# Patient Record
Sex: FEMALE | Race: WHITE | NOT HISPANIC OR LATINO | URBAN - METROPOLITAN AREA
[De-identification: names, ages, dates, MRNs, and addresses within clinical notes are randomized per-mention and may not be internally consistent; named-entity substitution may affect disease eponyms.]

---

## 2020-06-09 ENCOUNTER — APPOINTMENT (RX ONLY)
Dept: URBAN - METROPOLITAN AREA CLINIC 176 | Facility: CLINIC | Age: 30
Setting detail: DERMATOLOGY
End: 2020-06-09

## 2020-06-09 DIAGNOSIS — Z41.9 ENCOUNTER FOR PROCEDURE FOR PURPOSES OTHER THAN REMEDYING HEALTH STATE, UNSPECIFIED: ICD-10-CM

## 2020-06-09 PROCEDURE — ? BOTOX

## 2020-06-09 NOTE — PROCEDURE: BOTOX
Additional Area 1 Units: 0
Show Topical Anesthesia: Yes
Show Lcl Units: No
Detail Level: Detailed
Expiration Date (Month Year): 11/2022
Price (Use Numbers Only, No Special Characters Or $): 576
Forehead Units: 10
Lot #: [id number]
Dilution (U/0.1 Cc): 5
Consent: Written consent obtained. Risks include but not limited to lid/brow ptosis, bruising, swelling, diplopia, temporary effect, incomplete chemical denervation.
Additional Area 1 Location: Chin
Glabellar Complex Units: 12.5
Post-Care Instructions: Patient instructed to not lie down for 4 hours and limit physical activity for 24 hours. Patient instructed not to travel by airplane for 48 hours.

## 2021-01-11 ENCOUNTER — APPOINTMENT (RX ONLY)
Dept: URBAN - METROPOLITAN AREA CLINIC 176 | Facility: CLINIC | Age: 31
Setting detail: DERMATOLOGY
End: 2021-01-11

## 2021-01-11 DIAGNOSIS — Z41.9 ENCOUNTER FOR PROCEDURE FOR PURPOSES OTHER THAN REMEDYING HEALTH STATE, UNSPECIFIED: ICD-10-CM

## 2021-01-11 PROCEDURE — ? BOTOX

## 2021-01-11 NOTE — PROCEDURE: BOTOX
Additional Area 1 Units: 0
Show Topical Anesthesia: Yes
Show Lcl Units: No
Detail Level: Detailed
Expiration Date (Month Year): 08/2023
Price (Use Numbers Only, No Special Characters Or $): 415
Forehead Units: 10
Lot #: [id number]
Dilution (U/0.1 Cc): 5
Consent: Written consent obtained. Risks include but not limited to lid/brow ptosis, bruising, swelling, diplopia, temporary effect, incomplete chemical denervation.
Additional Area 1 Location: Chin
Glabellar Complex Units: 12.5
Post-Care Instructions: Patient instructed to not lie down for 4 hours and limit physical activity for 24 hours. Patient instructed not to travel by airplane for 48 hours.

## 2021-01-27 ENCOUNTER — APPOINTMENT (RX ONLY)
Dept: URBAN - METROPOLITAN AREA CLINIC 176 | Facility: CLINIC | Age: 31
Setting detail: DERMATOLOGY
End: 2021-01-27

## 2021-01-27 DIAGNOSIS — Z41.9 ENCOUNTER FOR PROCEDURE FOR PURPOSES OTHER THAN REMEDYING HEALTH STATE, UNSPECIFIED: ICD-10-CM

## 2021-01-27 PROCEDURE — ? BOTOX

## 2021-01-27 NOTE — PROCEDURE: BOTOX
Additional Area 1 Units: 0
Show Topical Anesthesia: Yes
Show Lcl Units: No
Detail Level: Detailed
Expiration Date (Month Year): 08/2023
Forehead Units: 1
Lot #: [id number]
Dilution (U/0.1 Cc): 5
Consent: Written consent obtained. Risks include but not limited to lid/brow ptosis, bruising, swelling, diplopia, temporary effect, incomplete chemical denervation.
Additional Area 1 Location: Chin
Post-Care Instructions: Patient instructed to not lie down for 4 hours and limit physical activity for 24 hours. Patient instructed not to travel by airplane for 48 hours.

## 2021-03-16 ENCOUNTER — APPOINTMENT (RX ONLY)
Dept: URBAN - METROPOLITAN AREA CLINIC 176 | Facility: CLINIC | Age: 31
Setting detail: DERMATOLOGY
End: 2021-03-16

## 2021-03-16 DIAGNOSIS — Z41.9 ENCOUNTER FOR PROCEDURE FOR PURPOSES OTHER THAN REMEDYING HEALTH STATE, UNSPECIFIED: ICD-10-CM

## 2021-03-16 PROCEDURE — ? DYSPORT

## 2021-03-16 NOTE — PROCEDURE: DYSPORT
Levator Labii Superioris Units: 0
Additional Area 1 Location: Submandibular gland
Show Depressor Anguli Units: Yes
Expiration Date (Month Year): 09/30/2021
Glabellar Complex Units: 30
Show Mentalis Units: No
Detail Level: Detailed
Consent: Written consent obtained. Risks include but not limited to lid/brow ptosis, bruising, swelling, diplopia, temporary effect, incomplete chemical denervation.
Dilution (U/ 0.1cc): 10
Price (Use Numbers Only, No Special Characters Or $): 200
Additional Area 3 Location: chin
Post-Care Instructions: Patient instructed to not lie down for 4 hours and limit physical activity for 24 hours. Patient instructed not to travel by airplane for 48 hours.
Lot #: [id number]
Forehead Units: 30
Additional Area 2 Location: Orbicularis oris

## 2021-05-14 ENCOUNTER — APPOINTMENT (RX ONLY)
Dept: URBAN - METROPOLITAN AREA CLINIC 176 | Facility: CLINIC | Age: 31
Setting detail: DERMATOLOGY
End: 2021-05-14

## 2021-05-14 VITALS — WEIGHT: 186 LBS | HEIGHT: 65 IN

## 2021-05-14 DIAGNOSIS — L20.89 OTHER ATOPIC DERMATITIS: ICD-10-CM

## 2021-05-14 DIAGNOSIS — I78.8 OTHER DISEASES OF CAPILLARIES: ICD-10-CM

## 2021-05-14 DIAGNOSIS — L65.0 TELOGEN EFFLUVIUM: ICD-10-CM

## 2021-05-14 PROBLEM — L20.84 INTRINSIC (ALLERGIC) ECZEMA: Status: ACTIVE | Noted: 2021-05-14

## 2021-05-14 PROCEDURE — ? COUNSELING

## 2021-05-14 PROCEDURE — ? PRESCRIPTION

## 2021-05-14 PROCEDURE — ? PRESCRIPTION MEDICATION MANAGEMENT

## 2021-05-14 PROCEDURE — 99213 OFFICE O/P EST LOW 20 MIN: CPT

## 2021-05-14 RX ORDER — TRIAMCINOLONE ACETONIDE 1 MG/G
CREAM TOPICAL BID
Qty: 1 | Refills: 1 | Status: ERX | COMMUNITY
Start: 2021-05-14

## 2021-05-14 RX ADMIN — TRIAMCINOLONE ACETONIDE: 1 CREAM TOPICAL at 00:00

## 2021-05-14 ASSESSMENT — LOCATION DETAILED DESCRIPTION DERM
LOCATION DETAILED: SUPERIOR MID FOREHEAD
LOCATION DETAILED: RIGHT OCCIPITAL SCALP
LOCATION DETAILED: RIGHT SUPERIOR PARIETAL SCALP
LOCATION DETAILED: LEFT DISTAL PRETIBIAL REGION
LOCATION DETAILED: RIGHT DISTAL PRETIBIAL REGION
LOCATION DETAILED: RIGHT MEDIAL BUTTOCK
LOCATION DETAILED: LEFT OCCIPITAL SCALP

## 2021-05-14 ASSESSMENT — LOCATION SIMPLE DESCRIPTION DERM
LOCATION SIMPLE: SCALP
LOCATION SIMPLE: RIGHT BUTTOCK
LOCATION SIMPLE: SUPERIOR FOREHEAD
LOCATION SIMPLE: RIGHT PRETIBIAL REGION
LOCATION SIMPLE: POSTERIOR SCALP
LOCATION SIMPLE: LEFT PRETIBIAL REGION

## 2021-05-14 ASSESSMENT — LOCATION ZONE DERM
LOCATION ZONE: TRUNK
LOCATION ZONE: SCALP
LOCATION ZONE: LEG
LOCATION ZONE: FACE

## 2021-05-14 ASSESSMENT — SEVERITY ASSESSMENT 2020: SEVERITY 2020: MILD

## 2021-05-14 NOTE — HPI: SKIN LESION
Has Your Skin Lesion Been Treated?: not been treated
Is This A New Presentation, Or A Follow-Up?: Skin Lesion
Additional History: Patient states she has had the lesion since after she had her son.

## 2021-05-14 NOTE — PROCEDURE: PRESCRIPTION MEDICATION MANAGEMENT
Initiate Treatment: TAC cream BID as needed.
Render In Strict Bullet Format?: No
Detail Level: Detailed
Samples Given: Dove for men cleanser, Cetaphil cream qd

## 2021-06-09 ENCOUNTER — APPOINTMENT (RX ONLY)
Dept: URBAN - METROPOLITAN AREA CLINIC 176 | Facility: CLINIC | Age: 31
Setting detail: DERMATOLOGY
End: 2021-06-09

## 2021-06-09 DIAGNOSIS — Z41.9 ENCOUNTER FOR PROCEDURE FOR PURPOSES OTHER THAN REMEDYING HEALTH STATE, UNSPECIFIED: ICD-10-CM

## 2021-06-09 PROCEDURE — ? DYSPORT

## 2021-06-09 NOTE — PROCEDURE: DYSPORT
Levator Labii Superioris Units: 0
Additional Area 1 Location: Submandibular gland
Show Depressor Anguli Units: Yes
Expiration Date (Month Year): 01/31/2022
Glabellar Complex Units: 30
Show Mentalis Units: No
Detail Level: Detailed
Consent: Written consent obtained. Risks include but not limited to lid/brow ptosis, bruising, swelling, diplopia, temporary effect, incomplete chemical denervation.
Dilution (U/ 0.1cc): 10
Price (Use Numbers Only, No Special Characters Or $): 200
Additional Area 3 Location: chin
Post-Care Instructions: Patient instructed to not lie down for 4 hours and limit physical activity for 24 hours. Patient instructed not to travel by airplane for 48 hours.
Lot #: [id number]
Forehead Units: 30
Additional Area 2 Location: Orbicularis oris

## 2021-11-02 ENCOUNTER — APPOINTMENT (RX ONLY)
Dept: URBAN - METROPOLITAN AREA CLINIC 176 | Facility: CLINIC | Age: 31
Setting detail: DERMATOLOGY
End: 2021-11-02

## 2021-11-02 DIAGNOSIS — Z41.9 ENCOUNTER FOR PROCEDURE FOR PURPOSES OTHER THAN REMEDYING HEALTH STATE, UNSPECIFIED: ICD-10-CM

## 2021-11-02 PROCEDURE — ? DYSPORT

## 2021-11-02 NOTE — PROCEDURE: DYSPORT
Levator Labii Superioris Units: 0
Additional Area 1 Location: Submandibular gland
Show Depressor Anguli Units: Yes
Expiration Date (Month Year): 3/31/2022
Glabellar Complex Units: 30
Show Mentalis Units: No
Detail Level: Detailed
Consent: Written consent obtained. Risks include but not limited to lid/brow ptosis, bruising, swelling, diplopia, temporary effect, incomplete chemical denervation.
Dilution (U/ 0.1cc): 10
Price (Use Numbers Only, No Special Characters Or $): 240
Additional Area 3 Location: chin
Post-Care Instructions: Patient instructed to not lie down for 4 hours and limit physical activity for 24 hours. Patient instructed not to travel by airplane for 48 hours.
Lot #: [id number]
Forehead Units: 30
Additional Area 2 Location: Orbicularis oris

## 2022-03-10 ENCOUNTER — APPOINTMENT (RX ONLY)
Dept: URBAN - METROPOLITAN AREA CLINIC 176 | Facility: CLINIC | Age: 32
Setting detail: DERMATOLOGY
End: 2022-03-10

## 2022-03-10 DIAGNOSIS — Z41.9 ENCOUNTER FOR PROCEDURE FOR PURPOSES OTHER THAN REMEDYING HEALTH STATE, UNSPECIFIED: ICD-10-CM

## 2022-03-10 PROCEDURE — ? DYSPORT

## 2022-03-10 NOTE — PROCEDURE: DYSPORT
Levator Labii Superioris Units: 0
Additional Area 1 Location: Submandibular gland
Show Depressor Anguli Units: Yes
Expiration Date (Month Year): 8/31/2022
Glabellar Complex Units: 30
Show Mentalis Units: No
Detail Level: Detailed
Consent: Written consent obtained. Risks include but not limited to lid/brow ptosis, bruising, swelling, diplopia, temporary effect, incomplete chemical denervation.
Dilution (U/ 0.1cc): 10
Price (Use Numbers Only, No Special Characters Or $): 240
Additional Area 3 Location: chin
Post-Care Instructions: Patient instructed to not lie down for 4 hours and limit physical activity for 24 hours. Patient instructed not to travel by airplane for 48 hours.
Lot #: [id number]
Forehead Units: 30
Additional Area 2 Location: Orbicularis oris

## 2022-11-07 ENCOUNTER — APPOINTMENT (RX ONLY)
Dept: URBAN - METROPOLITAN AREA CLINIC 176 | Facility: CLINIC | Age: 32
Setting detail: DERMATOLOGY
End: 2022-11-07

## 2022-11-07 DIAGNOSIS — Z41.9 ENCOUNTER FOR PROCEDURE FOR PURPOSES OTHER THAN REMEDYING HEALTH STATE, UNSPECIFIED: ICD-10-CM

## 2022-11-07 PROCEDURE — ? DYSPORT

## 2022-11-07 NOTE — PROCEDURE: DYSPORT
Levator Labii Superioris Units: 0
Additional Area 1 Location: Submandibular gland
Show Depressor Anguli Units: Yes
Expiration Date (Month Year): 3/31/2023
Glabellar Complex Units: 25
Show Mentalis Units: No
Detail Level: Detailed
Consent: Written consent obtained. Risks include but not limited to lid/brow ptosis, bruising, swelling, diplopia, temporary effect, incomplete chemical denervation.
Dilution (U/ 0.1cc): 10
Periorbital Skin Units: 5
Price (Use Numbers Only, No Special Characters Or $): 240
Additional Area 3 Location: chin
Post-Care Instructions: Patient instructed to not lie down for 4 hours and limit physical activity for 24 hours. Patient instructed not to travel by airplane for 48 hours.
Lot #: [id number]
Forehead Units: 30
Additional Area 2 Location: Orbicularis oris

## 2022-12-12 ENCOUNTER — APPOINTMENT (RX ONLY)
Dept: URBAN - METROPOLITAN AREA CLINIC 176 | Facility: CLINIC | Age: 32
Setting detail: DERMATOLOGY
End: 2022-12-12

## 2022-12-12 DIAGNOSIS — Z41.9 ENCOUNTER FOR PROCEDURE FOR PURPOSES OTHER THAN REMEDYING HEALTH STATE, UNSPECIFIED: ICD-10-CM

## 2022-12-12 PROCEDURE — ? DYSPORT

## 2022-12-12 NOTE — PROCEDURE: DYSPORT
Levator Labii Superioris Units: 0
Show Levator Superior Units: Yes
Consent: Written consent obtained. Risks include but not limited to lid/brow ptosis, bruising, swelling, diplopia, temporary effect, incomplete chemical denervation.
Additional Area 3 Location: chin
Show Ucl Units: No
Post-Care Instructions: Patient instructed to not lie down for 4 hours and limit physical activity for 24 hours. Patient instructed not to travel by airplane for 48 hours.
Lot #: [id number]
Dilution (U/ 0.1cc): 10
Forehead Units: 7.5
Expiration Date (Month Year): 05/31/2023
Additional Area 2 Location: Orbicularis oris
Detail Level: Detailed

## 2023-02-23 ENCOUNTER — APPOINTMENT (RX ONLY)
Dept: URBAN - METROPOLITAN AREA CLINIC 176 | Facility: CLINIC | Age: 33
Setting detail: DERMATOLOGY
End: 2023-02-23

## 2023-02-23 DIAGNOSIS — L30.9 DERMATITIS, UNSPECIFIED: ICD-10-CM

## 2023-02-23 DIAGNOSIS — Z41.9 ENCOUNTER FOR PROCEDURE FOR PURPOSES OTHER THAN REMEDYING HEALTH STATE, UNSPECIFIED: ICD-10-CM

## 2023-02-23 PROCEDURE — ? ADDITIONAL NOTES

## 2023-02-23 PROCEDURE — ? DYSPORT

## 2023-02-23 PROCEDURE — ? PRESCRIPTION

## 2023-02-23 RX ORDER — CLOBETASOL PROPIONATE 0.5 MG/G
OINTMENT TOPICAL BID
Qty: 15 | Refills: 1 | Status: ERX | COMMUNITY
Start: 2023-02-23

## 2023-02-23 RX ADMIN — CLOBETASOL PROPIONATE: 0.5 OINTMENT TOPICAL at 00:00

## 2023-02-23 ASSESSMENT — LOCATION ZONE DERM: LOCATION ZONE: TRUNK

## 2023-02-23 ASSESSMENT — LOCATION DETAILED DESCRIPTION DERM: LOCATION DETAILED: RIGHT BUTTOCK

## 2023-02-23 ASSESSMENT — LOCATION SIMPLE DESCRIPTION DERM: LOCATION SIMPLE: RIGHT BUTTOCK

## 2023-02-23 NOTE — PROCEDURE: ADDITIONAL NOTES
Additional Notes: Plan to try dysport once more at current dose of 60units. If not improved will increase units next time.\\nReassured patient dose=duration and should come in q 3 months.
Render Risk Assessment In Note?: yes
Detail Level: Detailed

## 2023-02-23 NOTE — PROCEDURE: DYSPORT
Levator Labii Superioris Units: 0
Additional Comments: Suggested going up to 120 units but wants to stick to 60 u
Show Levator Superior Units: Yes
Consent: Written consent obtained. Risks include but not limited to lid/brow ptosis, bruising, swelling, diplopia, temporary effect, incomplete chemical denervation.
Additional Area 3 Location: chin
Show Ucl Units: No
Post-Care Instructions: Patient instructed to not lie down for 4 hours and limit physical activity for 24 hours. Patient instructed not to travel by airplane for 48 hours.
Lot #: [id number]
Dilution (U/ 0.1cc): 10
Forehead Units: 30
Glabellar Complex Units: 22.5
Inferior Lateral Orbicularis Oculi Units: 7.5
Expiration Date (Month Year): 05/31/2023
Additional Area 2 Location: Orbicularis oris
Price (Use Numbers Only, No Special Characters Or $): 300
Detail Level: Detailed

## 2024-01-25 ENCOUNTER — APPOINTMENT (RX ONLY)
Dept: URBAN - METROPOLITAN AREA CLINIC 176 | Facility: CLINIC | Age: 34
Setting detail: DERMATOLOGY
End: 2024-01-25

## 2024-01-25 DIAGNOSIS — Z41.9 ENCOUNTER FOR PROCEDURE FOR PURPOSES OTHER THAN REMEDYING HEALTH STATE, UNSPECIFIED: ICD-10-CM

## 2024-01-25 PROCEDURE — ? DYSPORT

## 2024-01-25 PROCEDURE — ? ADDITIONAL NOTES

## 2024-01-25 NOTE — PROCEDURE: DYSPORT
Levator Labii Superioris Units: 0
Additional Comments: Suggested going up to 120 units but wants to stick to 60 u
Show Levator Superior Units: Yes
Consent: Written consent obtained. Risks include but not limited to lid/brow ptosis, bruising, swelling, diplopia, temporary effect, incomplete chemical denervation.
Additional Area 3 Location: chin
Show Ucl Units: No
Post-Care Instructions: Patient instructed to not lie down for 4 hours and limit physical activity for 24 hours. Patient instructed not to travel by airplane for 48 hours.
Lot #: [id number]
Dilution (U/0.1 Cc): 10
Forehead Units: 30
Glabellar Complex Units: 22.5
Inferior Lateral Orbicularis Oculi Units: 7.5
Expiration Date (Month Year): 06/30/2025
Additional Area 2 Location: Orbicularis oris
Price (Use Numbers Only, No Special Characters Or $): 300
Detail Level: Detailed

## 2024-04-03 ENCOUNTER — APPOINTMENT (RX ONLY)
Dept: URBAN - METROPOLITAN AREA CLINIC 176 | Facility: CLINIC | Age: 34
Setting detail: DERMATOLOGY
End: 2024-04-03

## 2024-04-03 DIAGNOSIS — Z41.9 ENCOUNTER FOR PROCEDURE FOR PURPOSES OTHER THAN REMEDYING HEALTH STATE, UNSPECIFIED: ICD-10-CM

## 2024-04-03 PROCEDURE — ? ADDITIONAL NOTES

## 2024-04-03 PROCEDURE — ? DYSPORT

## 2024-04-03 NOTE — PROCEDURE: DYSPORT
Levator Labii Superioris Units: 0
Additional Comments: Suggested going up to 120 units but wants to stick to 60 u
Show Levator Superior Units: Yes
Consent: Written consent obtained. Risks include but not limited to lid/brow ptosis, bruising, swelling, diplopia, temporary effect, incomplete chemical denervation.
Additional Area 3 Location: chin
Show Ucl Units: No
Periorbital Skin Units: 7.5
Post-Care Instructions: Patient instructed to not lie down for 4 hours and limit physical activity for 24 hours. Patient instructed not to travel by airplane for 48 hours.
Lot #: [id number]
Dilution (U/0.1 Cc): 10
Forehead Units: 37.5
Glabellar Complex Units: 15
Expiration Date (Month Year): 8/31/25
Additional Area 2 Location: Orbicularis oris
Price (Use Numbers Only, No Special Characters Or $): 300
Detail Level: Detailed

## 2024-06-18 ENCOUNTER — APPOINTMENT (RX ONLY)
Dept: URBAN - METROPOLITAN AREA CLINIC 176 | Facility: CLINIC | Age: 34
Setting detail: DERMATOLOGY
End: 2024-06-18

## 2024-06-18 DIAGNOSIS — Z41.9 ENCOUNTER FOR PROCEDURE FOR PURPOSES OTHER THAN REMEDYING HEALTH STATE, UNSPECIFIED: ICD-10-CM

## 2024-06-18 DIAGNOSIS — L91.8 OTHER HYPERTROPHIC DISORDERS OF THE SKIN: ICD-10-CM

## 2024-06-18 PROCEDURE — ? LIQUID NITROGEN (COSMETIC)

## 2024-06-18 PROCEDURE — ? DYSPORT

## 2024-06-18 ASSESSMENT — LOCATION ZONE DERM
LOCATION ZONE: TRUNK
LOCATION ZONE: AXILLAE

## 2024-06-18 ASSESSMENT — LOCATION DETAILED DESCRIPTION DERM
LOCATION DETAILED: RIGHT AXILLARY VAULT
LOCATION DETAILED: SUBXIPHOID
LOCATION DETAILED: RIGHT RIB CAGE

## 2024-06-18 ASSESSMENT — LOCATION SIMPLE DESCRIPTION DERM
LOCATION SIMPLE: RIGHT AXILLARY VAULT
LOCATION SIMPLE: ABDOMEN

## 2024-06-18 NOTE — PROCEDURE: DYSPORT
Levator Labii Superioris Units: 0
Additional Comments: Suggested going up to 120 units but wants to stick to 60 u
Show Levator Superior Units: Yes
Consent: Written consent obtained. Risks include but not limited to lid/brow ptosis, bruising, swelling, diplopia, temporary effect, incomplete chemical denervation.
Additional Area 3 Location: chin
Show Ucl Units: No
Periorbital Skin Units: 7.5
Post-Care Instructions: Patient instructed to not lie down for 4 hours and limit physical activity for 24 hours. Patient instructed not to travel by airplane for 48 hours.
Lot #: [id number]
Dilution (U/0.1 Cc): 10
Forehead Units: 37.5
Glabellar Complex Units: 15
Expiration Date (Month Year): 11/30/25
Additional Area 2 Location: Orbicularis oris
Price (Use Numbers Only, No Special Characters Or $): 300
Detail Level: Detailed

## 2024-10-29 ENCOUNTER — APPOINTMENT (RX ONLY)
Dept: URBAN - METROPOLITAN AREA CLINIC 176 | Facility: CLINIC | Age: 34
Setting detail: DERMATOLOGY
End: 2024-10-29

## 2024-10-29 DIAGNOSIS — Z41.9 ENCOUNTER FOR PROCEDURE FOR PURPOSES OTHER THAN REMEDYING HEALTH STATE, UNSPECIFIED: ICD-10-CM

## 2024-10-29 PROCEDURE — ? DYSPORT

## 2024-10-29 NOTE — PROCEDURE: DYSPORT
Levator Labii Superioris Units: 0
Additional Comments: Suggested going up to 120 units but wants to stick to 60 u
Show Levator Superior Units: Yes
Consent: Written consent obtained. Risks include but not limited to lid/brow ptosis, bruising, swelling, diplopia, temporary effect, incomplete chemical denervation.
Additional Area 3 Location: chin
Show Ucl Units: No
Periorbital Skin Units: 7.5
Post-Care Instructions: Patient instructed to not lie down for 4 hours and limit physical activity for 24 hours. Patient instructed not to travel by airplane for 48 hours.
Lot #: [id number]
Dilution (U/0.1 Cc): 10
Forehead Units: 37.5
Glabellar Complex Units: 15
Expiration Date (Month Year): 4/30/2026
Additional Area 2 Location: Orbicularis oris
Price (Use Numbers Only, No Special Characters Or $): 300
Detail Level: Detailed

## 2025-03-05 ENCOUNTER — APPOINTMENT (OUTPATIENT)
Dept: URBAN - METROPOLITAN AREA CLINIC 176 | Facility: CLINIC | Age: 35
Setting detail: DERMATOLOGY
End: 2025-03-05

## 2025-05-06 ENCOUNTER — APPOINTMENT (OUTPATIENT)
Dept: URBAN - METROPOLITAN AREA CLINIC 176 | Facility: CLINIC | Age: 35
Setting detail: DERMATOLOGY
End: 2025-05-06

## 2025-05-30 ENCOUNTER — APPOINTMENT (OUTPATIENT)
Dept: URBAN - METROPOLITAN AREA CLINIC 176 | Facility: CLINIC | Age: 35
Setting detail: DERMATOLOGY
End: 2025-05-30

## 2025-07-01 ENCOUNTER — APPOINTMENT (OUTPATIENT)
Dept: URBAN - METROPOLITAN AREA CLINIC 176 | Facility: CLINIC | Age: 35
Setting detail: DERMATOLOGY
End: 2025-07-01